# Patient Record
Sex: MALE | Race: WHITE | Employment: FULL TIME | URBAN - METROPOLITAN AREA
[De-identification: names, ages, dates, MRNs, and addresses within clinical notes are randomized per-mention and may not be internally consistent; named-entity substitution may affect disease eponyms.]

---

## 2021-01-22 ENCOUNTER — OFFICE VISIT (OUTPATIENT)
Dept: PEDIATRICS | Facility: CLINIC | Age: 36
End: 2021-01-22
Payer: COMMERCIAL

## 2021-01-22 ENCOUNTER — ANCILLARY PROCEDURE (OUTPATIENT)
Dept: GENERAL RADIOLOGY | Facility: CLINIC | Age: 36
End: 2021-01-22
Attending: FAMILY MEDICINE
Payer: COMMERCIAL

## 2021-01-22 VITALS
DIASTOLIC BLOOD PRESSURE: 62 MMHG | RESPIRATION RATE: 16 BRPM | BODY MASS INDEX: 26.22 KG/M2 | OXYGEN SATURATION: 97 % | TEMPERATURE: 97.3 F | HEART RATE: 93 BPM | HEIGHT: 69 IN | SYSTOLIC BLOOD PRESSURE: 116 MMHG | WEIGHT: 177 LBS

## 2021-01-22 DIAGNOSIS — M54.50 MIDLINE LOW BACK PAIN WITHOUT SCIATICA, UNSPECIFIED CHRONICITY: Primary | ICD-10-CM

## 2021-01-22 DIAGNOSIS — M54.50 MIDLINE LOW BACK PAIN WITHOUT SCIATICA, UNSPECIFIED CHRONICITY: ICD-10-CM

## 2021-01-22 PROCEDURE — 72100 X-RAY EXAM L-S SPINE 2/3 VWS: CPT | Performed by: RADIOLOGY

## 2021-01-22 PROCEDURE — 99203 OFFICE O/P NEW LOW 30 MIN: CPT | Performed by: FAMILY MEDICINE

## 2021-01-22 ASSESSMENT — MIFFLIN-ST. JEOR: SCORE: 1728.25

## 2021-01-22 NOTE — PROGRESS NOTES
"  CHIEF COMPLAINT    Back pain.      HISTORY    He has had widely  back pain episodes. Location is upper lumbar, midline, non radiating.     Once around 10 yr ago - pinching pain after giving a standing speech.     3 yr ago following dead lift training. This has repeated itself.    More recently some dull, bilat groin discomfort. Has a neg U/A and exam.      REVIEW OF SYSTEMS    No fever  No dysuria  No numbness or weakness.  No rash.      SOCIAL HISTORY    Occ - .       EXAM  /62 (BP Location: Right arm, Patient Position: Chair, Cuff Size: Adult Regular)   Pulse 93   Temp 97.3  F (36.3  C) (Tympanic)   Resp 16   Ht 1.753 m (5' 9\")   Wt 80.3 kg (177 lb)   SpO2 97%   BMI 26.14 kg/m      Weight basically nl.  Spine ROM is normal.  SLR's neg.  Gait nl.  No inguinal hernia present.      Results for orders placed or performed in visit on 01/22/21   XR Lumbar Spine 2/3 Views     Status: None    Narrative    LUMBAR SPINE TWO - THREE VIEWS   1/22/2021 2:59 PM     HISTORY: Intermittent pain upper lumbar. Midline low back pain without  sciatica, unspecified chronicity.    COMPARISON: None.      Impression    IMPRESSION: No fracture is identified. Mild degenerative disc disease  and facet arthropathy. Slight dextroconvex curvature of the  thoracolumbar junction.    SYD HINES MD     Films near normal.      (M54.5) Midline low back pain without sciatica, unspecified chronicity  (primary encounter diagnosis)  Comment:   Plan: XR Lumbar Spine 2/3 Views          Rec -   Work on core strengthening.  Occ Naproxen or Ibu.  Patient advised to return for worsening or persistent symptoms.      "

## 2021-01-22 NOTE — PROGRESS NOTES
{PROVIDER CHARTING PREFERENCE:436729}    Conrad Linares is a 35 year old who presents to clinic today for the following health issues {ACCOMPANIED BY STATEMENT (Optional):190269}    HPI       {SUPERLIST (Optional):385725}  {additonal problems for provider to add (Optional):799979}    Review of Systems   {ROS COMP (Optional):501914}      Objective    There were no vitals taken for this visit.  There is no height or weight on file to calculate BMI.  Physical Exam   {Exam List (Optional):390222}    {Diagnostic Test Results (Optional):400818}    {AMBULATORY ATTESTATION (Optional):623674}